# Patient Record
Sex: MALE | Race: ASIAN | NOT HISPANIC OR LATINO | Employment: UNEMPLOYED | URBAN - METROPOLITAN AREA
[De-identification: names, ages, dates, MRNs, and addresses within clinical notes are randomized per-mention and may not be internally consistent; named-entity substitution may affect disease eponyms.]

---
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
## 2025-06-18 ENCOUNTER — TELEPHONE (OUTPATIENT)
Dept: FAMILY MEDICINE CLINIC | Facility: CLINIC | Age: 20
End: 2025-06-18
Payer: COMMERCIAL

## 2025-06-18 NOTE — TELEPHONE ENCOUNTER
LVM for pt to call back to see if he wanted to move up his appointment to 6/20 with SHERIDAN Meza due to acute issue    Shelby Bonilla MA  06/18/25, 14:50 EDT

## 2025-06-23 ENCOUNTER — OFFICE VISIT (OUTPATIENT)
Dept: FAMILY MEDICINE CLINIC | Facility: CLINIC | Age: 20
End: 2025-06-23
Payer: MEDICAID

## 2025-06-23 VITALS
WEIGHT: 92.8 LBS | SYSTOLIC BLOOD PRESSURE: 116 MMHG | DIASTOLIC BLOOD PRESSURE: 69 MMHG | OXYGEN SATURATION: 95 % | BODY MASS INDEX: 18.22 KG/M2 | TEMPERATURE: 97.5 F | HEART RATE: 59 BPM | HEIGHT: 60 IN

## 2025-06-23 DIAGNOSIS — R20.0 NUMBNESS AND TINGLING IN LEFT ARM: Primary | ICD-10-CM

## 2025-06-23 DIAGNOSIS — R20.2 NUMBNESS AND TINGLING IN LEFT ARM: Primary | ICD-10-CM

## 2025-06-23 DIAGNOSIS — G47.8 SLEEP PARALYSIS: ICD-10-CM

## 2025-06-23 DIAGNOSIS — R00.2 PALPITATIONS: ICD-10-CM

## 2025-06-23 PROCEDURE — 99214 OFFICE O/P EST MOD 30 MIN: CPT | Performed by: NURSE PRACTITIONER

## 2025-06-23 PROCEDURE — 1159F MED LIST DOCD IN RCRD: CPT | Performed by: NURSE PRACTITIONER

## 2025-06-23 PROCEDURE — 93000 ELECTROCARDIOGRAM COMPLETE: CPT | Performed by: NURSE PRACTITIONER

## 2025-06-23 PROCEDURE — 1126F AMNT PAIN NOTED NONE PRSNT: CPT | Performed by: NURSE PRACTITIONER

## 2025-06-23 PROCEDURE — 1160F RVW MEDS BY RX/DR IN RCRD: CPT | Performed by: NURSE PRACTITIONER

## 2025-06-23 NOTE — PROGRESS NOTES
"Chief Complaint  Numbness, Palpitations (/), and Abdominal Pain    Subjective        Abhay Martinez presents to [facility]  History of Present Illness    History of Present Illness  The patient presents for evaluation of numbness in his legs, left arm, and jaw, palpitations, and sleep paralysis.    He reports experiencing unexplained numbness in both legs, his left arm, and jaw. He also describes episodes of rapid heartbeats, followed by a pause, and then a return to normal rhythm. These episodes can occur up to six times a day, with the onset traced back to 05/2025. He recalls an incident while playing pickleball where he felt as though he might faint, but did not experience any lightheadedness. He does not report any chest pain but notes feeling extremely fatigued and weak after these episodes. He maintains a healthy diet and reports no changes in bowel habits. He has a history of seeing a cardiologist during his teenage years.    He has discontinued the use of Abilify and Lexapro and is currently only taking vitamin D supplements.    He has experienced sleep paralysis, with the most recent episode occurring two weeks ago.    SOCIAL HISTORY  He does not smoke, vape, or use drugs. He quit vaping in February 2025. Denies STI risk.        Objective   Vital Signs:  /69   Pulse 59   Temp 97.5 °F (36.4 °C) (Temporal)   Ht 104.1 cm (41\")   Wt 42.1 kg (92 lb 12.8 oz)   SpO2 95%   BMI 38.81 kg/m²   Estimated body mass index is 38.81 kg/m² as calculated from the following:    Height as of this encounter: 104.1 cm (41\").    Weight as of this encounter: 42.1 kg (92 lb 12.8 oz).  99 %ile (Z= 2.25, 127% of 95%ile) based on CDC (Boys, 2-20 Years) BMI-for-age based on BMI available on 6/23/2025.    Pediatric BMI = 99 %ile (Z= 2.25, 127% of 95%ile) based on CDC (Boys, 2-20 Years) BMI-for-age based on BMI available on 6/23/2025.. Class 2 Severe Obesity (BMI >=35 and <=39.9). BMI skewed due to " b/l LE amputation      Physical Exam  Vitals and nursing note reviewed.   Constitutional:       General: He is not in acute distress.     Appearance: He is well-developed. He is not ill-appearing or diaphoretic.   HENT:      Head: Normocephalic and atraumatic.      Right Ear: Tympanic membrane, ear canal and external ear normal.      Left Ear: Tympanic membrane, ear canal and external ear normal.      Mouth/Throat:      Mouth: Mucous membranes are moist.      Pharynx: No posterior oropharyngeal erythema.   Eyes:      General: No scleral icterus.        Right eye: No discharge.         Left eye: No discharge.      Conjunctiva/sclera: Conjunctivae normal.   Cardiovascular:      Rate and Rhythm: Normal rate and regular rhythm.      Heart sounds: Normal heart sounds.   Pulmonary:      Effort: Pulmonary effort is normal.      Breath sounds: Normal breath sounds.   Abdominal:      General: Bowel sounds are normal. There is no distension.      Palpations: Abdomen is soft.      Tenderness: There is no abdominal tenderness.   Musculoskeletal:      Cervical back: Neck supple.      Comments: No changes in chronic congenital MS deformities   Lymphadenopathy:      Cervical: No cervical adenopathy.   Skin:     General: Skin is warm and dry.   Neurological:      Mental Status: He is alert and oriented to person, place, and time.   Psychiatric:         Mood and Affect: Mood normal.         Behavior: Behavior normal.         Thought Content: Thought content normal.      Comments: Very pleasant, conversant, good eye contact          Physical Exam       Result Review :            Results  Reviewed cardiology note from 2/18/2022           ECG 12 Lead    Date/Time: 6/23/2025 6:08 PM  Performed by: Alberta Lara APRN    Authorized by: Alberta Lara APRN  Comparison: not compared with previous ECG   Previous ECG: no previous ECG available  Rhythm: sinus rhythm and sinus arrhythmia  Rate: normal  Conduction: conduction  normal  QRS axis: left  Other findings: left ventricular hypertrophy    Clinical impression: abnormal EKG            Assessment and Plan     Diagnoses and all orders for this visit:    1. Numbness and tingling in left arm (Primary)  -     Vitamin B12  -     Hemoglobin A1c  -     ECG 12 Lead    2. Palpitations  -     CBC (No Diff)  -     Comprehensive Metabolic Panel  -     ECG 12 Lead    3. Sleep paralysis  -     CBC (No Diff)  -     Comprehensive Metabolic Panel  -     TSH  -     Iron Profile w/o Ferritin        Assessment & Plan  1. Numbness and tingling.  - Reports numbness in both legs, left arm, and jaw, along with episodes of heart palpitations and fatigue.  - Symptoms started in 05/2025; EKG showed minor abnormalities  - Electrolyte imbalance suspected; blood work to check electrolytes and B12 levels.  - Advised to maintain adequate hydration and monitor sodium and potassium intake.  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  Was instructed to continue hydration alone, florinef stopped.   -reported to have had normal echo and holter  -check labs  -I think it would be a good idea to get est with cardiologist or follow up with pediatric cardiologist if able.    2. Palpitations.  - Experiences episodes of heart palpitations, described as a rapid heartbeat followed by a pause, occurring sporadically, sometimes up to six times a day.  - EKG showed minor abnormalities, but nothing significant.  - Electrolyte imbalance suspected; blood work to check electrolytes and B12 levels.  - Advised to maintain adequate hydration and monitor sodium and potassium intake.    3. Sleep paralysis.  - Reports experiencing sleep paralysis, with the last episode occurring two weeks ago.  - Condition discussed, noting it is more common in individuals with a past history of anxiety, trauma or inadequate sleep, sleep disorders.  - No specific treatment recommended  at this time; reassured that the condition is real and can be managed with proper sleep hygiene.  - Advised to maintain proper sleep hygiene.  -if continues, may need eval by sleep medicine    4. Health maintenance.  - Will receive the third HPV vaccine today.  - Blood work to be conducted to check electrolytes and B12 levels.  - Advised to maintain a healthy diet and exercise routine.  - No recent sexual activity reported; STD screening previously negative. Safer sex discussed  -denies tobacco, vaping, etoh, recreational drug use since February            Follow Up     No follow-ups on file.  Patient was given instructions and counseling regarding his condition or for health maintenance advice. Please see specific information pulled into the AVS if appropriate.    Patient or patient representative verbalized consent for the use of Ambient Listening during the visit with  ROSARIO Rosa for chart documentation. 7/8/2025  18:22 EDT

## 2025-06-30 ENCOUNTER — TELEPHONE (OUTPATIENT)
Dept: FAMILY MEDICINE CLINIC | Facility: CLINIC | Age: 20
End: 2025-06-30

## 2025-06-30 NOTE — TELEPHONE ENCOUNTER
Caller: Elizabeth Martinez    Relationship: Mother    Best call back number: [phone] (Home)     What is the best time to reach you: ANYTIME    Do you know the name of the person who called:     What was the call regarding: SCHEDULING LABS    Is it okay if the provider responds through [facility]: YES    PATIENT'S MOTHER IS CALLING TO SCHEDULE LABS FOR PATIENT.

## 2025-07-01 ENCOUNTER — TELEPHONE (OUTPATIENT)
Dept: FAMILY MEDICINE CLINIC | Facility: CLINIC | Age: 20
End: 2025-07-01
Payer: MEDICAID

## 2025-07-01 NOTE — TELEPHONE ENCOUNTER
Patient came in for lab appointment. Informed patient plan is stating inactive. Called insurance company and confirmed coverage was inactive starting 6/30/2025. Patient  stated he does have coverage and a new card was not presented to patient and left office.

## 2025-07-28 ENCOUNTER — TELEPHONE (OUTPATIENT)
Dept: FAMILY MEDICINE CLINIC | Facility: CLINIC | Age: 20
End: 2025-07-28

## 2025-07-28 ENCOUNTER — CLINICAL SUPPORT (OUTPATIENT)
Dept: FAMILY MEDICINE CLINIC | Facility: CLINIC | Age: 20
End: 2025-07-28
Payer: MEDICAID

## 2025-07-28 DIAGNOSIS — Z23 NEED FOR VACCINATION: Primary | ICD-10-CM

## 2025-07-28 DIAGNOSIS — Z23 NEED FOR VACCINATION: ICD-10-CM

## 2025-07-28 DIAGNOSIS — Z23 NEED FOR HPV VACCINE: ICD-10-CM

## 2025-07-28 PROCEDURE — 90651 9VHPV VACCINE 2/3 DOSE IM: CPT | Performed by: NURSE PRACTITIONER

## 2025-07-28 PROCEDURE — 90471 IMMUNIZATION ADMIN: CPT | Performed by: NURSE PRACTITIONER

## 2025-07-29 LAB
ALBUMIN SERPL-MCNC: 4.9 G/DL (ref 3.5–5.2)
ALBUMIN/GLOB SERPL: 2.3 G/DL
ALP SERPL-CCNC: 91 U/L (ref 39–117)
ALT SERPL-CCNC: 11 U/L (ref 1–41)
AST SERPL-CCNC: 17 U/L (ref 1–40)
BILIRUB SERPL-MCNC: 0.7 MG/DL (ref 0–1.2)
BUN SERPL-MCNC: 12 MG/DL (ref 6–20)
BUN/CREAT SERPL: 15 (ref 7–25)
CALCIUM SERPL-MCNC: 10.1 MG/DL (ref 8.6–10.5)
CHLORIDE SERPL-SCNC: 104 MMOL/L (ref 98–107)
CO2 SERPL-SCNC: 22.3 MMOL/L (ref 22–29)
CREAT SERPL-MCNC: 0.8 MG/DL (ref 0.76–1.27)
EGFRCR SERPLBLD CKD-EPI 2021: 129.9 ML/MIN/1.73
ERYTHROCYTE [DISTWIDTH] IN BLOOD BY AUTOMATED COUNT: 12.6 % (ref 12.3–15.4)
GLOBULIN SER CALC-MCNC: 2.1 GM/DL
GLUCOSE SERPL-MCNC: 92 MG/DL (ref 65–99)
HBA1C MFR BLD: 5.2 % (ref 4.8–5.6)
HCT VFR BLD AUTO: 48.3 % (ref 37.5–51)
HGB BLD-MCNC: 16 G/DL (ref 13–17.7)
IRON SATN MFR SERPL: 11 % (ref 20–50)
IRON SERPL-MCNC: 50 MCG/DL (ref 59–158)
MCH RBC QN AUTO: 29.5 PG (ref 26.6–33)
MCHC RBC AUTO-ENTMCNC: 33.1 G/DL (ref 31.5–35.7)
MCV RBC AUTO: 89 FL (ref 79–97)
PLATELET # BLD AUTO: 244 10*3/MM3 (ref 140–450)
POTASSIUM SERPL-SCNC: 4.2 MMOL/L (ref 3.5–5.2)
PROT SERPL-MCNC: 7 G/DL (ref 6–8.5)
RBC # BLD AUTO: 5.43 10*6/MM3 (ref 4.14–5.8)
SODIUM SERPL-SCNC: 140 MMOL/L (ref 136–145)
TIBC SERPL-MCNC: 460 MCG/DL
TSH SERPL DL<=0.005 MIU/L-ACNC: 2.01 UIU/ML (ref 0.27–4.2)
UIBC SERPL-MCNC: 410 MCG/DL (ref 112–346)
VIT B12 SERPL-MCNC: 574 PG/ML (ref 211–946)
WBC # BLD AUTO: 6.26 10*3/MM3 (ref 3.4–10.8)